# Patient Record
Sex: FEMALE | Race: BLACK OR AFRICAN AMERICAN | NOT HISPANIC OR LATINO | Employment: UNEMPLOYED | URBAN - METROPOLITAN AREA
[De-identification: names, ages, dates, MRNs, and addresses within clinical notes are randomized per-mention and may not be internally consistent; named-entity substitution may affect disease eponyms.]

---
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
## 2022-03-02 ENCOUNTER — HOSPITAL ENCOUNTER (EMERGENCY)
Age: 22
Discharge: HOME | End: 2022-03-02
Attending: STUDENT IN AN ORGANIZED HEALTH CARE EDUCATION/TRAINING PROGRAM
Payer: MEDICAID

## 2022-03-02 VITALS
BODY MASS INDEX: 19.14 KG/M2 | HEART RATE: 87 BPM | SYSTOLIC BLOOD PRESSURE: 142 MMHG | OXYGEN SATURATION: 100 % | TEMPERATURE: 98.5 F | DIASTOLIC BLOOD PRESSURE: 74 MMHG | RESPIRATION RATE: 16 BRPM | WEIGHT: 115 LBS

## 2022-03-02 DIAGNOSIS — T78.40XA ALLERGIC REACTION, INITIAL ENCOUNTER: Primary | ICD-10-CM

## 2022-03-02 PROCEDURE — 99284 EMERGENCY DEPT VISIT MOD MDM: CPT

## 2022-03-02 PROCEDURE — 6370000000 HC RX 637 (ALT 250 FOR IP): Performed by: STUDENT IN AN ORGANIZED HEALTH CARE EDUCATION/TRAINING PROGRAM

## 2022-03-02 RX ORDER — DIPHENHYDRAMINE HCL 25 MG
25 CAPSULE ORAL EVERY 6 HOURS PRN
Qty: 1 CAPSULE | Refills: 0 | Status: SHIPPED | OUTPATIENT
Start: 2022-03-02 | End: 2022-03-09

## 2022-03-02 RX ORDER — DIPHENHYDRAMINE HCL 25 MG
25 TABLET ORAL ONCE
Status: COMPLETED | OUTPATIENT
Start: 2022-03-02 | End: 2022-03-02

## 2022-03-02 RX ORDER — FAMOTIDINE 20 MG/1
20 TABLET, FILM COATED ORAL 2 TIMES DAILY
Qty: 14 TABLET | Refills: 0 | Status: SHIPPED | OUTPATIENT
Start: 2022-03-02 | End: 2022-03-09

## 2022-03-02 RX ORDER — PREDNISONE 50 MG/1
50 TABLET ORAL DAILY
Qty: 5 TABLET | Refills: 0 | Status: SHIPPED | OUTPATIENT
Start: 2022-03-02 | End: 2022-03-07

## 2022-03-02 RX ADMIN — DIPHENHYDRAMINE HYDROCHLORIDE 25 MG: 25 TABLET, FILM COATED ORAL at 21:59

## 2022-03-02 ASSESSMENT — ENCOUNTER SYMPTOMS
ABDOMINAL PAIN: 0
SHORTNESS OF BREATH: 1
VOMITING: 0
CHEST TIGHTNESS: 0
COUGH: 0
NAUSEA: 0

## 2022-03-03 NOTE — ED TRIAGE NOTES
Pt arrived by EMS after having allergic reaction at work. State previous allergic reaction to tomatoes was only itching but today was worse with diff breathing. Pt given solumedrol 125 mg IV, Pepcid 20 g IV, Benadryl 25 mg Iv and duoneb x1.
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
## 2022-05-09 ENCOUNTER — HOSPITAL ENCOUNTER (EMERGENCY)
Age: 22
Discharge: LEFT BEFORE DISCHARGE | End: 2022-05-09

## 2022-05-28 ENCOUNTER — HOSPITAL ENCOUNTER (EMERGENCY)
Age: 22
Discharge: HOME | End: 2022-05-28
Payer: MEDICAID

## 2022-05-28 VITALS
WEIGHT: 110 LBS | BODY MASS INDEX: 18.33 KG/M2 | SYSTOLIC BLOOD PRESSURE: 110 MMHG | HEIGHT: 65 IN | OXYGEN SATURATION: 100 % | DIASTOLIC BLOOD PRESSURE: 70 MMHG | RESPIRATION RATE: 18 BRPM | HEART RATE: 81 BPM | TEMPERATURE: 98.2 F

## 2022-05-28 DIAGNOSIS — Z20.822 COVID-19 RULED OUT: Primary | ICD-10-CM

## 2022-05-28 LAB
INFLUENZA A: NOT DETECTED
INFLUENZA B: NOT DETECTED
SARS-COV-2 RNA, RT PCR: NOT DETECTED

## 2022-05-28 PROCEDURE — 99283 EMERGENCY DEPT VISIT LOW MDM: CPT

## 2022-05-28 PROCEDURE — 87636 SARSCOV2 & INF A&B AMP PRB: CPT

## 2022-05-28 ASSESSMENT — ENCOUNTER SYMPTOMS
BLOOD IN STOOL: 0
SHORTNESS OF BREATH: 0
SORE THROAT: 0
RHINORRHEA: 0
ABDOMINAL PAIN: 0
VOMITING: 0
BACK PAIN: 0
EYE PAIN: 0
COUGH: 0
NAUSEA: 1
DIARRHEA: 0

## 2022-05-28 ASSESSMENT — PAIN - FUNCTIONAL ASSESSMENT: PAIN_FUNCTIONAL_ASSESSMENT: NONE - DENIES PAIN

## 2022-05-28 NOTE — LETTER
[facility]  [address] [facility] [address]  Phone: [phone]               May 28, 2022    Patient: Mo Stock   YOB: 2000   Date of Visit: 5/28/2022       To Whom It May Concern:    Mo Stock was seen and treated in our emergency department on 5/28/2022. She may return to work on 05/29/2022.       Sincerely,       Roberto Flanagan RN         Signature:__________________________________

## 2022-05-28 NOTE — Clinical Note
Devonte Morrison was seen and treated in our emergency department on 5/28/2022. She may return to work on 05/29/2022. If you have any questions or concerns, please don't hesitate to call.       Gabe Leahy, YASEMIN - CNP
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
## 2022-07-05 ENCOUNTER — HOSPITAL ENCOUNTER (EMERGENCY)
Age: 22
Discharge: HOME | End: 2022-07-05

## 2022-09-11 ENCOUNTER — HOSPITAL ENCOUNTER (EMERGENCY)
Age: 22
Discharge: HOME | End: 2022-09-11
Payer: MEDICAID

## 2022-09-11 VITALS
SYSTOLIC BLOOD PRESSURE: 113 MMHG | OXYGEN SATURATION: 99 % | TEMPERATURE: 98.3 F | HEART RATE: 84 BPM | RESPIRATION RATE: 18 BRPM | DIASTOLIC BLOOD PRESSURE: 68 MMHG

## 2022-09-11 DIAGNOSIS — H66.002 NON-RECURRENT ACUTE SUPPURATIVE OTITIS MEDIA OF LEFT EAR WITHOUT SPONTANEOUS RUPTURE OF TYMPANIC MEMBRANE: Primary | ICD-10-CM

## 2022-09-11 DIAGNOSIS — J02.9 ACUTE PHARYNGITIS, UNSPECIFIED ETIOLOGY: ICD-10-CM

## 2022-09-11 PROCEDURE — 99283 EMERGENCY DEPT VISIT LOW MDM: CPT

## 2022-09-11 RX ORDER — AMOXICILLIN 500 MG/1
500 CAPSULE ORAL 2 TIMES DAILY
Qty: 14 CAPSULE | Refills: 0 | Status: SHIPPED | OUTPATIENT
Start: 2022-09-11 | End: 2022-09-18

## 2022-09-11 RX ORDER — NAPROXEN 500 MG/1
500 TABLET ORAL 2 TIMES DAILY PRN
Qty: 20 TABLET | Refills: 0 | Status: SHIPPED | OUTPATIENT
Start: 2022-09-11

## 2022-09-11 ASSESSMENT — PAIN - FUNCTIONAL ASSESSMENT: PAIN_FUNCTIONAL_ASSESSMENT: 0-10

## 2022-09-11 ASSESSMENT — ENCOUNTER SYMPTOMS
SORE THROAT: 1
GASTROINTESTINAL NEGATIVE: 1
TROUBLE SWALLOWING: 0
RESPIRATORY NEGATIVE: 1
EYES NEGATIVE: 1

## 2022-09-11 ASSESSMENT — PAIN SCALES - GENERAL: PAINLEVEL_OUTOF10: 4

## 2022-09-12 NOTE — ED NOTES
Pt scripts x2 instructed to follow up with PCP. Assessed per Moncho JOHNSON.      Brandon Salinas, ADDISON  56/38/35 [id number]

## 2022-12-02 ENCOUNTER — HOSPITAL ENCOUNTER (EMERGENCY)
Age: 22
Discharge: HOME | End: 2022-12-02
Payer: MEDICAID

## 2022-12-02 VITALS
HEART RATE: 99 BPM | OXYGEN SATURATION: 99 % | DIASTOLIC BLOOD PRESSURE: 69 MMHG | RESPIRATION RATE: 18 BRPM | SYSTOLIC BLOOD PRESSURE: 104 MMHG | TEMPERATURE: 98.6 F

## 2022-12-02 DIAGNOSIS — J02.9 ACUTE PHARYNGITIS, UNSPECIFIED ETIOLOGY: ICD-10-CM

## 2022-12-02 DIAGNOSIS — Z20.822 LAB TEST NEGATIVE FOR COVID-19 VIRUS: ICD-10-CM

## 2022-12-02 DIAGNOSIS — B34.9 VIRAL ILLNESS: Primary | ICD-10-CM

## 2022-12-02 DIAGNOSIS — H61.23 BILATERAL IMPACTED CERUMEN: ICD-10-CM

## 2022-12-02 LAB
INFLUENZA A: NOT DETECTED
INFLUENZA B: NOT DETECTED
S PYO AG THROAT QL: NEGATIVE
SARS-COV-2 RNA, RT PCR: NOT DETECTED

## 2022-12-02 PROCEDURE — 99283 EMERGENCY DEPT VISIT LOW MDM: CPT

## 2022-12-02 PROCEDURE — 6370000000 HC RX 637 (ALT 250 FOR IP): Performed by: NURSE PRACTITIONER

## 2022-12-02 PROCEDURE — 87880 STREP A ASSAY W/OPTIC: CPT

## 2022-12-02 PROCEDURE — 87636 SARSCOV2 & INF A&B AMP PRB: CPT

## 2022-12-02 PROCEDURE — 87081 CULTURE SCREEN ONLY: CPT

## 2022-12-02 RX ORDER — ACETAMINOPHEN 500 MG
500 TABLET ORAL 4 TIMES DAILY PRN
Qty: 28 TABLET | Refills: 0 | Status: SHIPPED | OUTPATIENT
Start: 2022-12-02 | End: 2022-12-09

## 2022-12-02 RX ORDER — IBUPROFEN 600 MG/1
600 TABLET ORAL ONCE
Status: COMPLETED | OUTPATIENT
Start: 2022-12-02 | End: 2022-12-02

## 2022-12-02 RX ORDER — LIDOCAINE HYDROCHLORIDE 20 MG/ML
15 SOLUTION OROPHARYNGEAL ONCE
Status: COMPLETED | OUTPATIENT
Start: 2022-12-02 | End: 2022-12-02

## 2022-12-02 RX ORDER — ACETAMINOPHEN 325 MG/1
650 TABLET ORAL ONCE
Status: COMPLETED | OUTPATIENT
Start: 2022-12-02 | End: 2022-12-02

## 2022-12-02 RX ORDER — IBUPROFEN 600 MG/1
600 TABLET ORAL 3 TIMES DAILY PRN
Qty: 15 TABLET | Refills: 0 | Status: SHIPPED | OUTPATIENT
Start: 2022-12-02 | End: 2022-12-07

## 2022-12-02 RX ORDER — BENZOCAINE/MENTH/CETYLPYRD CL 15 MG-2 MG
1 LOZENGE MUCOUS MEMBRANE
Qty: 30 LOZENGE | Refills: 0 | Status: SHIPPED | OUTPATIENT
Start: 2022-12-02 | End: 2022-12-07

## 2022-12-02 RX ADMIN — CARBAMIDE PEROXIDE 6.5% 5 DROP: 6.5 LIQUID AURICULAR (OTIC) at 17:09

## 2022-12-02 RX ADMIN — IBUPROFEN 600 MG: 600 TABLET, FILM COATED ORAL at 16:55

## 2022-12-02 RX ADMIN — LIDOCAINE HYDROCHLORIDE 15 ML: 20 SOLUTION ORAL at 16:57

## 2022-12-02 RX ADMIN — ACETAMINOPHEN 650 MG: 325 TABLET ORAL at 16:55

## 2022-12-02 ASSESSMENT — PAIN - FUNCTIONAL ASSESSMENT: PAIN_FUNCTIONAL_ASSESSMENT: 0-10

## 2022-12-02 ASSESSMENT — PAIN DESCRIPTION - LOCATION
LOCATION: EAR
LOCATION: EAR;THROAT

## 2022-12-02 ASSESSMENT — PAIN SCALES - GENERAL
PAINLEVEL_OUTOF10: 5
PAINLEVEL_OUTOF10: 4

## 2022-12-02 ASSESSMENT — PAIN DESCRIPTION - ORIENTATION: ORIENTATION: RIGHT;LEFT

## 2022-12-02 NOTE — DISCHARGE INSTRUCTIONS
Return to emergency room for new worsening symptoms including but not limited to, developing throat closing sensation, shortness of breath, inability to tolerate food or drink, difficulty opening closing her jaw, or other symptoms/concerns. Follow-up with the emergency care clinic by calling to schedule an appointment to establish care. Medication as prescribed.

## 2022-12-02 NOTE — LETTER
[facility]  ED  [address] [facility] [address]  Phone: [phone]  Fax: [phone]               December 2, 2022    Patient: Saba Alegria   YOB: 2000   Date of Visit: 12/2/2022       To Whom It May Concern:    Saba Alegria was seen and treated in our emergency department on 12/2/2022. She is able to return to work 12/3/2022.       Sincerely,       Mary Gutierrez RN         Signature:__________________________________

## 2022-12-04 LAB — S PYO THROAT QL CULT: NORMAL

## 2022-12-06 NOTE — ED PROVIDER NOTES
[address] [place] [address]  ED  [address] [place] [id number]  Dept: [phone]  Dept Fax: [phone]  Loc: [facility]        This patient was not seen or evaluated by the attending physician. I evaluated this patient, the attending physician was available for consultation. CHIEF COMPLAINT    Chief Complaint   Patient presents with    Otalgia     Bilateral ears x3 days    Pharyngitis       HPI    Mayra Perez is a 23 y.o. female who presents with a sore throat. The onset was 2-3 days ago. The duration has been constant since the onset. The pain worsens with swallowing. The patient has associated dry cough, bilateral ear pain described as \"jabbing\" with severity 5/10. The sore throat discomfort is described as \"burning\" with a severity of 10/10. Denies nausea, vomiting, dizziness, lightheadedness, presyncope, shortness of breath, fevers, chills, sweats, change in ability to smell/taste, hemoptysis, leg/calf pain or swelling, headache-resolved, body aches, abdominal pain, diarrhea, urinary symptoms/retention, rash, neck pain/stiffness, nasal chest congestion, other concerns. Patient is not COVID-19 or influenza vaccinated. She denies any sick contacts.   She does endorse a history of strep in the past.    REVIEW OF SYSTEMS    Pulmonary: No difficulty breathing  General: No fevers, no myalgia  GI: No abdominal pain or vomiting  ENT: see HPI      PAST MEDICAL AND SURGICAL HISTORY    Past Medical History:   Diagnosis Date    Asthma      Past Surgical History:   Procedure Laterality Date    TONSILLECTOMY         CURRENT MEDICATIONS  (may include discharge medications prescribed in the ED)  Current Outpatient Rx   Medication Sig Dispense Refill    ibuprofen (ADVIL;MOTRIN) 600 MG tablet Take 1 tablet by mouth 3 times daily as needed for Pain With meals 15 tablet 0    acetaminophen (TYLENOL) 500 MG tablet Take 1 tablet by mouth 4 times daily as needed for Pain 28 tablet 0    Benzocaine-Menthol (CEPACOL SORE THROAT) 10-2.1 MG LOZG Take 1 lozenge by mouth every 2 hours as needed (sorethroat) 30 lozenge 0    naproxen (NAPROSYN) 500 MG tablet Take 1 tablet by mouth 2 times daily as needed for Pain 20 tablet 0    famotidine (PEPCID) 20 MG tablet Take 1 tablet by mouth 2 times daily for 7 days 14 tablet 0    Prenatal Vit-Fe Fumarate-FA (PRENATAL 1+1 PO) Take by mouth (Patient not taking: Reported on 5/28/2022)      Multiple Vitamins-Minerals (THERAPEUTIC MULTIVITAMIN-MINERALS W/ IRON) TABS tablet Take 1 tablet by mouth daily 90 tablet 0       ALLERGIES    Allergies   Allergen Reactions    Tomato Itching       FAMILY AND SOCIAL HISTORY    History reviewed. No pertinent family history. Social History     Socioeconomic History    Marital status: Single     Spouse name: None    Number of children: None    Years of education: None    Highest education level: None   Tobacco Use    Smoking status: Never    Smokeless tobacco: Never   Vaping Use    Vaping Use: Never used   Substance and Sexual Activity    Alcohol use: Not Currently    Drug use: Not Currently       PHYSICAL EXAM    VITAL SIGNS: /69   Pulse 99   Temp 98.6 °F (37 °C) (Oral)   Resp 18   SpO2 99%   Constitutional:  Well developed, well nourished, no acute distress  Eyes: Sclera nonicteric, conjunctiva normal   Throat/Face: + Erythematous posterior pharynx, no tonsillar hypertrophy, no exudates, no midline shift of the uvula, no macroglossia, no trismus, no facial swelling  Neck: + Bilateral anterior cervical chain/submandibular tender lymphadenopathy, supple neck without meningismus  Ears: + Partial cerumen impactions bilaterally, no auricular redness or induration. No tragal tenderness.   Respiratory:  Lungs clear to auscultation, no retractions  Cardiovascular:  regular rate, normal rhythm, no murmurs  Musculoskeletal:  No edema   GI:  Soft, no abdominal tenderness, no guarding, bowel sounds present  Neurologic: Awake, alert and oriented, no slurred speech, no tremors or ataxia, no athetotic movements  Integument:  Skin is warm and dry, no rash    RADIOLOGY/PROCEDURES    No orders to display       ED COURSE & MEDICAL DECISION MAKING    See chart for details of medications prescribed    Vitals:    12/02/22 1613   BP: 104/69   Pulse: 99   Resp: 18   Temp: 98.6 °F (37 °C)   TempSrc: Oral   SpO2: 99%         Differential diagnosis: Group A strep, Airway Obstruction, Epiglottitis, Retropharyngeal Abscess, Parapharyngeal Abscess, Pneumonia, Hypoxemia, Dehydration, Reflux Pharyngitis, other    Patient is afebrile with no evidence of stridor, drooling, posturing or respiratory distress. Rapid strep negative. COVID-19 and influenza combo swab is negative for detection of SARS-CoV-2, influenza A or influenza B. Patient has impacted cerumen bilaterally. Ears were cleansed with Debrox and disimpacted. On reevaluation cerumen impaction is cleared bilaterally and the patient endorses good improvement of the ear discomfort. The patient was instructed to follow up as an outpatient in 1-2 days with emergency care clinic. The patient was instructed to return to the ED immediately for any new or worsening symptoms. I will prescribe medication for symptomatic relief. The patient verbalized understanding. FINAL IMPRESSION    1. Viral illness    2. Bilateral impacted cerumen    3. Lab test negative for COVID-19 virus    4.  Acute pharyngitis, unspecified etiology        PLAN  Discharge instructions and outpatient followup (see EMR)      (Please note that this note was completed with a voice recognition program.  Every attempt was made to edit the dictations, but inevitably there remain words that are mis-transcribed.)      YASEMIN Mathur - CNP  12/06/22 0845

## 2023-02-15 ENCOUNTER — HOSPITAL ENCOUNTER (EMERGENCY)
Age: 23
Discharge: HOME | End: 2023-02-15
Payer: MEDICAID

## 2023-02-15 ENCOUNTER — APPOINTMENT (OUTPATIENT)
Dept: ULTRASOUND IMAGING | Age: 23
End: 2023-02-15
Payer: MEDICAID

## 2023-02-15 VITALS
BODY MASS INDEX: 18.48 KG/M2 | HEIGHT: 66 IN | HEART RATE: 85 BPM | WEIGHT: 115 LBS | DIASTOLIC BLOOD PRESSURE: 72 MMHG | TEMPERATURE: 98.6 F | SYSTOLIC BLOOD PRESSURE: 108 MMHG | RESPIRATION RATE: 18 BRPM | OXYGEN SATURATION: 100 %

## 2023-02-15 DIAGNOSIS — O46.8X1 SUBCHORIONIC HEMATOMA IN FIRST TRIMESTER, FETUS 2 OF MULTIPLE GESTATION: ICD-10-CM

## 2023-02-15 DIAGNOSIS — O41.8X12 SUBCHORIONIC HEMATOMA IN FIRST TRIMESTER, FETUS 2 OF MULTIPLE GESTATION: ICD-10-CM

## 2023-02-15 DIAGNOSIS — R10.9 ABDOMINAL PAIN IN PREGNANCY, FIRST TRIMESTER: Primary | ICD-10-CM

## 2023-02-15 DIAGNOSIS — O26.891 ABDOMINAL PAIN IN PREGNANCY, FIRST TRIMESTER: Primary | ICD-10-CM

## 2023-02-15 LAB
A/G RATIO: 2 (ref 1.1–2.2)
ALBUMIN SERPL-MCNC: 4.5 G/DL (ref 3.4–5)
ALP BLD-CCNC: 56 U/L (ref 40–129)
ALT SERPL-CCNC: 8 U/L (ref 10–40)
ANION GAP SERPL CALCULATED.3IONS-SCNC: 10 MMOL/L (ref 3–16)
AST SERPL-CCNC: 12 U/L (ref 15–37)
BASOPHILS ABSOLUTE: 0 K/UL (ref 0–0.2)
BASOPHILS RELATIVE PERCENT: 0.3 %
BILIRUB SERPL-MCNC: 0.4 MG/DL (ref 0–1)
BILIRUBIN URINE: NEGATIVE
BLOOD, URINE: NEGATIVE
BUN BLDV-MCNC: 7 MG/DL (ref 7–20)
CALCIUM SERPL-MCNC: 8.6 MG/DL (ref 8.3–10.6)
CHLORIDE BLD-SCNC: 100 MMOL/L (ref 99–110)
CLARITY: CLEAR
CO2: 22 MMOL/L (ref 21–32)
COLOR: YELLOW
CREAT SERPL-MCNC: <0.5 MG/DL (ref 0.6–1.1)
EOSINOPHILS ABSOLUTE: 0 K/UL (ref 0–0.6)
EOSINOPHILS RELATIVE PERCENT: 0.6 %
GFR SERPL CREATININE-BSD FRML MDRD: >60 ML/MIN/{1.73_M2}
GLUCOSE BLD-MCNC: 67 MG/DL (ref 70–99)
GLUCOSE URINE: NEGATIVE MG/DL
GONADOTROPIN, CHORIONIC (HCG) QUANT: NORMAL MIU/ML
HCG(URINE) PREGNANCY TEST: POSITIVE
HCT VFR BLD CALC: 30.1 % (ref 36–48)
HEMOGLOBIN: 10.4 G/DL (ref 12–16)
KETONES, URINE: 15 MG/DL
LEUKOCYTE ESTERASE, URINE: NEGATIVE
LIPASE: 30 U/L (ref 13–60)
LYMPHOCYTES ABSOLUTE: 1.7 K/UL (ref 1–5.1)
LYMPHOCYTES RELATIVE PERCENT: 22.5 %
MCH RBC QN AUTO: 29.4 PG (ref 26–34)
MCHC RBC AUTO-ENTMCNC: 34.7 G/DL (ref 31–36)
MCV RBC AUTO: 84.9 FL (ref 80–100)
MICROSCOPIC EXAMINATION: ABNORMAL
MONOCYTES ABSOLUTE: 0.5 K/UL (ref 0–1.3)
MONOCYTES RELATIVE PERCENT: 6.5 %
NEUTROPHILS ABSOLUTE: 5.4 K/UL (ref 1.7–7.7)
NEUTROPHILS RELATIVE PERCENT: 70.1 %
NITRITE, URINE: NEGATIVE
PDW BLD-RTO: 13.8 % (ref 12.4–15.4)
PH UA: 6.5 (ref 5–8)
PLATELET # BLD: 206 K/UL (ref 135–450)
PMV BLD AUTO: 8.8 FL (ref 5–10.5)
POTASSIUM REFLEX MAGNESIUM: 3.8 MMOL/L (ref 3.5–5.1)
PROTEIN UA: NEGATIVE MG/DL
RBC # BLD: 3.55 M/UL (ref 4–5.2)
SODIUM BLD-SCNC: 132 MMOL/L (ref 136–145)
SPECIFIC GRAVITY UA: 1.01 (ref 1–1.03)
TOTAL PROTEIN: 6.8 G/DL (ref 6.4–8.2)
URINE REFLEX TO CULTURE: ABNORMAL
URINE TYPE: ABNORMAL
UROBILINOGEN, URINE: 1 E.U./DL
WBC # BLD: 7.7 K/UL (ref 4–11)

## 2023-02-15 PROCEDURE — 83690 ASSAY OF LIPASE: CPT

## 2023-02-15 PROCEDURE — 76801 OB US < 14 WKS SINGLE FETUS: CPT

## 2023-02-15 PROCEDURE — 81003 URINALYSIS AUTO W/O SCOPE: CPT

## 2023-02-15 PROCEDURE — 6370000000 HC RX 637 (ALT 250 FOR IP): Performed by: PHYSICIAN ASSISTANT

## 2023-02-15 PROCEDURE — 80053 COMPREHEN METABOLIC PANEL: CPT

## 2023-02-15 PROCEDURE — 2580000003 HC RX 258: Performed by: PHYSICIAN ASSISTANT

## 2023-02-15 PROCEDURE — 84702 CHORIONIC GONADOTROPIN TEST: CPT

## 2023-02-15 PROCEDURE — 84703 CHORIONIC GONADOTROPIN ASSAY: CPT

## 2023-02-15 PROCEDURE — 99284 EMERGENCY DEPT VISIT MOD MDM: CPT

## 2023-02-15 PROCEDURE — 36415 COLL VENOUS BLD VENIPUNCTURE: CPT

## 2023-02-15 PROCEDURE — 85025 COMPLETE CBC W/AUTO DIFF WBC: CPT

## 2023-02-15 RX ORDER — 0.9 % SODIUM CHLORIDE 0.9 %
1000 INTRAVENOUS SOLUTION INTRAVENOUS ONCE
Status: COMPLETED | OUTPATIENT
Start: 2023-02-15 | End: 2023-02-15

## 2023-02-15 RX ORDER — ACETAMINOPHEN 325 MG/1
650 TABLET ORAL ONCE
Status: COMPLETED | OUTPATIENT
Start: 2023-02-15 | End: 2023-02-15

## 2023-02-15 RX ADMIN — ACETAMINOPHEN 650 MG: 325 TABLET ORAL at 17:57

## 2023-02-15 RX ADMIN — SODIUM CHLORIDE 1000 ML: 9 INJECTION, SOLUTION INTRAVENOUS at 17:56

## 2023-02-15 ASSESSMENT — PAIN - FUNCTIONAL ASSESSMENT: PAIN_FUNCTIONAL_ASSESSMENT: 0-10

## 2023-02-15 ASSESSMENT — ENCOUNTER SYMPTOMS
SHORTNESS OF BREATH: 0
VOMITING: 0
ABDOMINAL PAIN: 1

## 2023-02-15 ASSESSMENT — PAIN SCALES - GENERAL: PAINLEVEL_OUTOF10: 5

## 2023-02-15 NOTE — ED PROVIDER NOTES
[address] ED  EMERGENCY DEPARTMENT ENCOUNTER        Pt Name: Veronica Orantes  MRN: [id number]  [place] 2000  Date of evaluation: 2/15/2023  Provider: Willie Albert PA-C  PCP: No primary care provider on file. Note Started: 4:01 PM EST 2/15/23      RAÚL. I have evaluated this patient. My supervising physician was available for consultation. CHIEF COMPLAINT       Chief Complaint   Patient presents with    Abdominal Pain     Patient comes in due to lower abdominal pain across whole abdomen. Patient is 8 weeks pregnant. Denies any bleeding. HISTORY OF PRESENT ILLNESS: 1 or more Elements     History From: Patient  Limitations to history : None    Veronica Orantes is a 23 y.o. female who presents to the emergency department for evaluation of lower abdominal pain symptoms for the past 2 to 3 days. She is 8 weeks pregnant. This is her third pregnancy. States no problems with the first 2. Denies any vaginal bleeding. Having crampy type intermittent pain in her lower abdomen and back. No fever no vomiting. No dysuria or hematuria. Plans to see  OB/GYN has not seen them yet for this pregnancy has not had an ultrasound yet with this pregnancy. Blood type 0+. Nursing Notes were all reviewed and agreed with or any disagreements were addressed in the HPI. REVIEW OF SYSTEMS :      Review of Systems   Constitutional:  Negative for fever. Respiratory:  Negative for shortness of breath. Cardiovascular:  Negative for chest pain. Gastrointestinal:  Positive for abdominal pain. Negative for vomiting. Genitourinary:  Negative for difficulty urinating, dysuria, hematuria, vaginal bleeding and vaginal discharge. Positives and Pertinent negatives as per HPI.      SURGICAL HISTORY     Past Surgical History:   Procedure Laterality Date    TONSILLECTOMY         CURRENTMEDICATIONS       Discharge Medication List as of 2/15/2023  8:54 PM        CONTINUE these medications which have NOT CHANGED    Details   ibuprofen (ADVIL;MOTRIN) 600 MG tablet Take 1 tablet by mouth 3 times daily as needed for Pain With meals, Disp-15 tablet, R-0Print      acetaminophen (TYLENOL) 500 MG tablet Take 1 tablet by mouth 4 times daily as needed for Pain, Disp-28 tablet, R-0Print      naproxen (NAPROSYN) 500 MG tablet Take 1 tablet by mouth 2 times daily as needed for Pain, Disp-20 tablet, R-0Normal      famotidine (PEPCID) 20 MG tablet Take 1 tablet by mouth 2 times daily for 7 days, Disp-14 tablet, R-0Normal      Prenatal Vit-Fe Fumarate-FA (PRENATAL 1+1 PO) Take by mouthHistorical Med      Multiple Vitamins-Minerals (THERAPEUTIC MULTIVITAMIN-MINERALS W/ IRON) TABS tablet Take 1 tablet by mouth daily, Disp-90 tablet, R-0Print             ALLERGIES     Tomato    FAMILYHISTORY     No family history on file. SOCIAL HISTORY       Social History     Tobacco Use    Smoking status: Never    Smokeless tobacco: Never   Vaping Use    Vaping Use: Never used   Substance Use Topics    Alcohol use: Not Currently    Drug use: Not Currently       SCREENINGS                         CIWA Assessment  BP: 108/72  Heart Rate: 85           PHYSICAL EXAM  1 or more Elements     ED Triage Vitals [02/15/23 1550]   BP Temp Temp Source Heart Rate Resp SpO2 Height Weight   90/60 98.6 °F (37 °C) Oral 82 19 98 % 5' 6\" (1.676 m) 115 lb (52.2 kg)       Physical Exam  Vitals and nursing note reviewed. Constitutional:       Appearance: She is well-developed. She is not ill-appearing or toxic-appearing. HENT:      Head: Normocephalic and atraumatic. Mouth/Throat:      Mouth: Mucous membranes are moist.      Pharynx: Oropharynx is clear. No posterior oropharyngeal erythema. Eyes:      Conjunctiva/sclera: Conjunctivae normal.   Cardiovascular:      Rate and Rhythm: Normal rate and regular rhythm. Pulmonary:      Effort: Pulmonary effort is normal. No respiratory distress. Breath sounds: Normal breath sounds.  No wheezing or rales.   Abdominal:      General: Bowel sounds are normal. There is no distension. Palpations: Abdomen is soft. There is no mass. Tenderness: There is abdominal tenderness (mild tenderness across lower abdomen). There is no guarding or rebound. Skin:     General: Skin is warm and dry. Neurological:      Mental Status: She is alert and oriented to person, place, and time. Motor: No abnormal muscle tone. Psychiatric:         Behavior: Behavior normal. Behavior is cooperative.            DIAGNOSTIC RESULTS   LABS:    Labs Reviewed   CBC WITH AUTO DIFFERENTIAL - Abnormal; Notable for the following components:       Result Value    RBC 3.55 (*)     Hemoglobin 10.4 (*)     Hematocrit 30.1 (*)     All other components within normal limits   COMPREHENSIVE METABOLIC PANEL W/ REFLEX TO MG FOR LOW K - Abnormal; Notable for the following components:    Sodium 132 (*)     Glucose 67 (*)     Creatinine <0.5 (*)     ALT 8 (*)     AST 12 (*)     All other components within normal limits   URINALYSIS WITH REFLEX TO CULTURE - Abnormal; Notable for the following components:    Ketones, Urine 15 (*)     All other components within normal limits   LIPASE   PREGNANCY, URINE   HCG, QUANTITATIVE, PREGNANCY     Results for orders placed or performed during the hospital encounter of 02/15/23   CBC with Auto Differential   Result Value Ref Range    WBC 7.7 4.0 - 11.0 K/uL    RBC 3.55 (L) 4.00 - 5.20 M/uL    Hemoglobin 10.4 (L) 12.0 - 16.0 g/dL    Hematocrit 30.1 (L) 36.0 - 48.0 %    MCV 84.9 80.0 - 100.0 fL    MCH 29.4 26.0 - 34.0 pg    MCHC 34.7 31.0 - 36.0 g/dL    RDW 13.8 12.4 - 15.4 %    Platelets 082 353 - 773 K/uL    MPV 8.8 5.0 - 10.5 fL    Neutrophils % 70.1 %    Lymphocytes % 22.5 %    Monocytes % 6.5 %    Eosinophils % 0.6 %    Basophils % 0.3 %    Neutrophils Absolute 5.4 1.7 - 7.7 K/uL    Lymphocytes Absolute 1.7 1.0 - 5.1 K/uL    Monocytes Absolute 0.5 0.0 - 1.3 K/uL    Eosinophils Absolute 0.0 0.0 - 0.6 K/uL Basophils Absolute 0.0 0.0 - 0.2 K/uL   Comprehensive Metabolic Panel w/ Reflex to MG   Result Value Ref Range    Sodium 132 (L) 136 - 145 mmol/L    Potassium reflex Magnesium 3.8 3.5 - 5.1 mmol/L    Chloride 100 99 - 110 mmol/L    CO2 22 21 - 32 mmol/L    Anion Gap 10 3 - 16    Glucose 67 (L) 70 - 99 mg/dL    BUN 7 7 - 20 mg/dL    Creatinine <0.5 (L) 0.6 - 1.1 mg/dL    Est, Glom Filt Rate >60 >60    Calcium 8.6 8.3 - 10.6 mg/dL    Total Protein 6.8 6.4 - 8.2 g/dL    Albumin 4.5 3.4 - 5.0 g/dL    Albumin/Globulin Ratio 2.0 1.1 - 2.2    Total Bilirubin 0.4 0.0 - 1.0 mg/dL    Alkaline Phosphatase 56 40 - 129 U/L    ALT 8 (L) 10 - 40 U/L    AST 12 (L) 15 - 37 U/L   Lipase   Result Value Ref Range    Lipase 30.0 13.0 - 60.0 U/L   Urinalysis with Reflex to Culture    Specimen: Urine, clean catch   Result Value Ref Range    Color, UA Yellow Straw/Yellow    Clarity, UA Clear Clear    Glucose, Ur Negative Negative mg/dL    Bilirubin Urine Negative Negative    Ketones, Urine 15 (A) Negative mg/dL    Specific Gravity, UA 1.015 1.005 - 1.030    Blood, Urine Negative Negative    pH, UA 6.5 5.0 - 8.0    Protein, UA Negative Negative mg/dL    Urobilinogen, Urine 1.0 <2.0 E.U./dL    Nitrite, Urine Negative Negative    Leukocyte Esterase, Urine Negative Negative    Microscopic Examination Not Indicated     Urine Type NotGiven     Urine Reflex to Culture Not Indicated    Pregnancy, Urine   Result Value Ref Range    HCG(Urine) Pregnancy Test POSITIVE Detects HCG level >20 MIU/mL   hCG, quantitative, pregnancy   Result Value Ref Range    hCG Quant 974037.0 <5.0 mIU/mL       When ordered only abnormal lab results are displayed. All other labs were within normal range or not returned as of this dictation. EKG: When ordered, EKG's are interpreted by the Emergency Department Physician in the absence of a cardiologist.  Please see their note for interpretation of EKG.     RADIOLOGY:   Non-plain film images such as CT, Ultrasound and MRI are read by the radiologist. Plain radiographic images are visualized and preliminarily interpreted by the ED Provider with the below findings:      Interpretation per the Radiologist below, if available at the time of this note:    US OB LESS THAN 14 [address] [place]   Final Result   Live twin dichorionic diamniotic pregnancy of gestational age 7 weeks 5 days. Small subchorionic hemorrhage. US OB LESS THAN 14 WEEKS SINGLE OR FIRST GESTATION    Result Date: 2/15/2023  EXAMINATION: FIRST TRIMESTER OBSTETRIC ULTRASOUND 2/15/2023 TECHNIQUE: Transvaginal first trimester obstetric pelvic ultrasound was performed. COMPARISON: None HISTORY: ORDERING SYSTEM PROVIDED HISTORY: 8 weeks pregnant, abdominal pain, r/o ectopic TECHNOLOGIST PROVIDED HISTORY: Reason for exam:->8 weeks pregnant, abdominal pain, r/o ectopic FINDINGS: Uterus: 7.8 x 6.5 x 4.7 cm Gestational Sac(s):  Twin dichorionic diamniotic pregnancy. Small subchorionic hemorrhage. Yolk Sacs:  Present Fetal Pole: Two fetal poles Fetus A: Crown Rump Length:  8 mm Fetal Heart Rate:  151 Fetus B: Crown Rump Length:  9 mm Fetal Heart Rate:  159 Right ovary: 10.6 x 1.6 x 1.7 cm Left ovary: 3.3 x 1.3 x 1.6 cm Flow is preserved to the ovaries with appropriate waveforms. Free fluid: None Measurements: Estimated gestational age by current ultrasound: 6 weeks 5 days Estimated gestational age by LMP/prior ultrasound: Unknown Estimated Due Date: 10/06/2023     Live twin dichorionic diamniotic pregnancy of gestational age 7 weeks 5 days. Small subchorionic hemorrhage. No results found. PROCEDURES   Unless otherwise noted below, none     Procedures    CRITICAL CARE TIME (.cctime)   0    PAST MEDICAL HISTORY      has a past medical history of Asthma.      EMERGENCY DEPARTMENT COURSE and DIFFERENTIAL DIAGNOSIS/MDM:   Vitals:    Vitals:    02/15/23 1550 02/15/23 1900   BP: 90/60 108/72   Pulse: 82 85   Resp: 19 18   Temp: 98.6 °F (37 °C) TempSrc: Oral    SpO2: 98% 100%   Weight: 115 lb (52.2 kg)    Height: 5' 6\" (1.676 m)        Patient was given the following medications:  Medications   0.9 % sodium chloride bolus (0 mLs IntraVENous Stopped 2/15/23 1911)   acetaminophen (TYLENOL) tablet 650 mg (650 mg Oral Given 2/15/23 1757)             Is this patient to be included in the SEP-1 Core Measure due to severe sepsis or septic shock? No   Exclusion criteria - the patient is NOT to be included for SEP-1 Core Measure due to: Infection is not suspected    Chronic Conditions affecting care:    has a past medical history of Asthma. CONSULTS: (Who and What was discussed)  None      Social Determinants Significantly Affecting Health: None    Records Reviewed (External and Source)   Outpatient OB/GYN records patient has been seen at [facility] in past for high risk pregnancy    CC/HPI Summary, DDx, ED Course, and Reassessment:     Patient presented to the ER for evaluation of lower abdominal pain she is 8 weeks pregnant. She has mild tenderness across lower abdomen on exam.  Given Tylenol here. We will obtain work-up including CBC, CMP, lipase, urinalysis and first trimester pelvic ultrasound. Glucose a little low at 67 patient eating cheese crackers here. Urinalysis negative for urinary tract infection. Pelvic ultrasound resulted showing live twin dichorionic diamniotic pregnancy of gestational age 7 weeks 5 days. Small subchorionic hemorrhage. She does not have any vaginal bleeding  Blood type is O+  Plans to see  OB/GYN and has an appointment on January 27 call out to see OB/GYN for consult    I spoke with Dr Keith Brito ob/gyn notified of patient's symptoms and test results in agreement with outpatient follow-up patient is stable advised pelvic rest.  She will be discharged home with family. Advise returning for any worsening symptoms. She understands and agrees.       I estimate there is LOW risk for OVARIAN TORSION, ECTOPIC PREGNANCY, ACUTE APPENDICITIS, BOWEL OBSTRUCTION thus I consider the discharge disposition reasonable. Also, there is no evidence or peritonitis, sepsis, or toxicity. I am the Primary Clinician of Record. FINAL IMPRESSION      1. Abdominal pain in pregnancy, first trimester    2.  Subchorionic hematoma in first trimester, fetus 2 of multiple gestation          DISPOSITION/PLAN     DISPOSITION Decision to Discharge    PATIENT REFERRED TO:   OB/GYN      As previously scheduled    DISCHARGE MEDICATIONS:  Discharge Medication List as of 2/15/2023  8:54 PM          DISCONTINUED MEDICATIONS:  Discharge Medication List as of 2/15/2023  8:54 PM                 (Please note that portions of this note were completed with a voice recognition program.  Efforts were made to edit the dictations but occasionally words are mis-transcribed.)    Dona Marie PA-C (electronically signed)           Delia Valenzuela PA-C  02/15/23 1948

## 2023-02-16 NOTE — ED NOTES
1956 call placed to [facility] for consult with [facility]      Horace Minus  02/15/23 1959

## 2023-03-07 ENCOUNTER — OFFICE VISIT (OUTPATIENT)
Dept: URGENT CARE | Age: 23
End: 2023-03-07

## 2023-03-07 VITALS
OXYGEN SATURATION: 98 % | DIASTOLIC BLOOD PRESSURE: 76 MMHG | SYSTOLIC BLOOD PRESSURE: 110 MMHG | HEART RATE: 85 BPM | BODY MASS INDEX: 16.64 KG/M2 | WEIGHT: 106 LBS | HEIGHT: 67 IN | TEMPERATURE: 98.5 F

## 2023-03-07 DIAGNOSIS — R51.9 ACUTE INTRACTABLE HEADACHE, UNSPECIFIED HEADACHE TYPE: Primary | ICD-10-CM

## 2023-03-07 DIAGNOSIS — E86.0 DEHYDRATION: ICD-10-CM

## 2023-03-07 LAB
Lab: NORMAL
PERFORMING INSTRUMENT: NORMAL
QC PASS/FAIL: NORMAL
SARS-COV-2, POC: NORMAL

## 2023-03-07 RX ORDER — LANOLIN ALCOHOL/MO/W.PET/CERES
50 CREAM (GRAM) TOPICAL 3 TIMES DAILY
COMMUNITY

## 2023-03-07 RX ORDER — ONDANSETRON 8 MG/1
4 TABLET, ORALLY DISINTEGRATING ORAL EVERY 8 HOURS PRN
COMMUNITY

## 2023-03-07 ASSESSMENT — ENCOUNTER SYMPTOMS
NAUSEA: 1
ABDOMINAL PAIN: 0
VOMITING: 0
DIARRHEA: 0
COUGH: 0

## 2023-03-07 NOTE — PATIENT INSTRUCTIONS
Covid testing was negative  Increase water intake. Use flavoring for taste. Tylenol as needed for headaches  Call OB/GYN and notify physician of symptoms.

## 2023-03-07 NOTE — PROGRESS NOTES
Herman Langford (:  2000) is a 23 y.o. female,New patient, here for evaluation of the following chief complaint(s):  Headache and Nausea (10 weeks pregnant with twins)      ASSESSMENT/PLAN:  1. Acute intractable headache, unspecified headache type  Rapid Covid negative  VSS, patient normotensive  Increase water intake. Use flavoring for taste. Tylenol as needed for headaches  Call OB/GYN and notify physician of symptoms.     - POCT COVID-19, Antigen     Return if symptoms worsen or fail to improve. SUBJECTIVE/OBJECTIVE:  Patient comes in today for headache and nausea for one days. Patient is  10 weeks OB with twins. States she was hospitalized last week for hyperemesis gravidarum. Has been placed on Vitamin B6, Unisom and Zofran PRN. Denies any abdominal pain or leaking of fluids. States Tylenol has not been helping at home. Patient has been able to eat, states fluid intake has been down and she mostly drinks soda. Eating [employer] french fries and milkshake during visit. Next prenatal visit 2023      History provided by:  Patient   used: No    Headache    Vitals:    23 1324   BP: 110/76   Site: Right Upper Arm   Position: Sitting   Cuff Size: Medium Adult   Pulse: 85   Temp: 98.5 °F (36.9 °C)   TempSrc: Oral   SpO2: 98%   Weight: 106 lb (48.1 kg)   Height: 5' 6.5\" (1.689 m)       Review of Systems   Constitutional:  Negative for fever. HENT:  Negative for congestion. Respiratory:  Negative for cough. Gastrointestinal:  Positive for nausea. Negative for abdominal pain, diarrhea and vomiting. Genitourinary:  Negative for vaginal bleeding and vaginal discharge. Neurological:  Positive for headaches. Physical Exam  Constitutional:       Appearance: Normal appearance. HENT:      Head: Normocephalic and atraumatic. Cardiovascular:      Rate and Rhythm: Normal rate and regular rhythm. Pulses: Normal pulses. Heart sounds: Normal heart sounds.  No murmur heard. No friction rub. No gallop. Pulmonary:      Effort: Pulmonary effort is normal.      Breath sounds: Normal breath sounds. Abdominal:      General: Abdomen is flat. Bowel sounds are normal.      Palpations: Abdomen is soft. Tenderness: There is no abdominal tenderness. Skin:     General: Skin is warm and dry. Coloration: Skin is pale. Neurological:      Mental Status: She is alert and oriented to person, place, and time. An electronic signature was used to authenticate this note.     --NANO Galeano - CNP

## 2023-03-07 NOTE — LETTER
March 7, 2023       Nafisa Farrell YOB: 2000   [address] [facility] Date of Visit:  3/7/2023       To Whom It May Concern: It is my medical opinion that Nafisa Farrell may return to work on 3/8/2023. If you have any questions or concerns, please don't hesitate to call.     Sincerely,        Luis Alberto Saha, NANO - CNP

## 2023-03-09 ENCOUNTER — OFFICE VISIT (OUTPATIENT)
Dept: URGENT CARE | Age: 23
End: 2023-03-09

## 2023-03-09 VITALS
HEIGHT: 66 IN | DIASTOLIC BLOOD PRESSURE: 68 MMHG | TEMPERATURE: 98.2 F | WEIGHT: 105 LBS | SYSTOLIC BLOOD PRESSURE: 101 MMHG | BODY MASS INDEX: 16.88 KG/M2 | HEART RATE: 92 BPM | OXYGEN SATURATION: 99 %

## 2023-03-09 DIAGNOSIS — H69.83 DYSFUNCTION OF BOTH EUSTACHIAN TUBES: Primary | ICD-10-CM

## 2023-03-09 RX ORDER — FLUTICASONE PROPIONATE 50 MCG
1 SPRAY, SUSPENSION (ML) NASAL DAILY
Qty: 16 G | Refills: 0 | COMMUNITY
Start: 2023-03-09

## 2023-03-09 ASSESSMENT — ENCOUNTER SYMPTOMS
SINUS PRESSURE: 0
COUGH: 1
VOMITING: 1
SINUS PAIN: 0
SORE THROAT: 0

## 2023-03-09 NOTE — LETTER
March 9, 2023       Bryan Downs YOB: 2000   [address] [facility] Date of Visit:  3/9/2023       To Whom It May Concern: It is my medical opinion that Bryan Downs may return to work on 3/10/23. If you have any questions or concerns, please don't hesitate to call.     Sincerely,          Linda Bennett, NANO - CNP

## 2023-03-09 NOTE — PROGRESS NOTES
Luci Perdue (:  2000) is a 23 y.o. female,New patient, here for evaluation of the following chief complaint(s):  Cough (Entered by patient), Otalgia (Congestion and left ear pain approx 1 week), and Congestion      ASSESSMENT/PLAN:    ICD-10-CM    1. Dysfunction of both eustachian tubes  H69.83 fluticasone (FLONASE) 50 MCG/ACT nasal spray        Reviewed AVS with patient. All questions answered    Return if symptoms worsen or fail to improve. SUBJECTIVE/OBJECTIVE:  23year old female presents with c/o left ear pain and mild cough for 1 week. She has h/o ear infections and was last treated ~ 2 months ago with ATB. She rates ear pain 5/10 and is intermittent. She has not treated with OTC medications for her symptoms. Has known exposure to sig other with similar symptoms. History provided by:  Patient   used: No      Vitals:    23 1701   BP: 101/68   Pulse: 92   Temp: 98.2 °F (36.8 °C)   SpO2: 99%   Weight: 105 lb (47.6 kg)   Height: 5' 6\" (1.676 m)       Review of Systems   Constitutional:  Positive for appetite change. Negative for fatigue and fever. HENT:  Positive for congestion and ear pain. Negative for sinus pressure, sinus pain and sore throat. Left ear pain   Respiratory:  Positive for cough. Dry cough    Cardiovascular: Negative. Gastrointestinal:  Positive for vomiting. Has morning sickness with pregnancy   Neurological:  Positive for headaches. Physical Exam  Vitals reviewed. Constitutional:       General: She is not in acute distress. Appearance: Normal appearance. She is not ill-appearing. HENT:      Head: Normocephalic. Right Ear: Hearing, ear canal and external ear normal. A middle ear effusion is present. No PE tube. Tympanic membrane is not erythematous or bulging. Left Ear: Hearing, ear canal and external ear normal. A middle ear effusion is present. No PE tube.  Tympanic membrane is not erythematous or bulging.      Nose: No septal deviation, mucosal edema, congestion or rhinorrhea.      Right Sinus: No maxillary sinus tenderness or frontal sinus tenderness.      Left Sinus: No maxillary sinus tenderness or frontal sinus tenderness.      Mouth/Throat:      Pharynx: Oropharynx is clear. Uvula midline. No pharyngeal swelling, oropharyngeal exudate, posterior oropharyngeal erythema or uvula swelling.      Tonsils: No tonsillar exudate or tonsillar abscesses. 0 on the right. 0 on the left.   Eyes:      General:         Right eye: No discharge.         Left eye: No discharge.      Conjunctiva/sclera: Conjunctivae normal.   Cardiovascular:      Rate and Rhythm: Normal rate and regular rhythm.      Pulses: Normal pulses.      Heart sounds: Normal heart sounds. No murmur heard.  Pulmonary:      Effort: Pulmonary effort is normal. No respiratory distress.      Breath sounds: Normal breath sounds.      Comments: No cough on exam   Musculoskeletal:      Cervical back: Normal range of motion and neck supple. No rigidity or tenderness.   Skin:     General: Skin is warm and dry.   Neurological:      Mental Status: She is alert and oriented to person, place, and time.   Psychiatric:         Behavior: Behavior normal.         An electronic signature was used to authenticate this note.    --Janet Montano, APRN - CNP

## 2023-03-18 ENCOUNTER — OFFICE VISIT (OUTPATIENT)
Dept: URGENT CARE | Age: 23
End: 2023-03-18

## 2023-03-18 VITALS
HEIGHT: 67 IN | WEIGHT: 107.6 LBS | DIASTOLIC BLOOD PRESSURE: 72 MMHG | OXYGEN SATURATION: 99 % | SYSTOLIC BLOOD PRESSURE: 106 MMHG | TEMPERATURE: 98.2 F | BODY MASS INDEX: 16.89 KG/M2 | HEART RATE: 119 BPM

## 2023-03-18 DIAGNOSIS — H60.501 ACUTE OTITIS EXTERNA OF RIGHT EAR, UNSPECIFIED TYPE: Primary | ICD-10-CM

## 2023-03-18 RX ORDER — NEOMYCIN SULFATE, POLYMYXIN B SULFATE, HYDROCORTISONE 3.5; 10000; 1 MG/ML; [USP'U]/ML; MG/ML
2 SOLUTION/ DROPS AURICULAR (OTIC) EVERY 8 HOURS SCHEDULED
Qty: 1 EACH | Refills: 0 | Status: SHIPPED | OUTPATIENT
Start: 2023-03-18 | End: 2023-03-28

## 2023-03-18 NOTE — PROGRESS NOTES
Claudine Jenkins (:  2000) is a 23 y.o. female,Established patient, here for evaluation of the following chief complaint(s):  Otalgia (Causing, x6 hrs. No other sxs. )      ASSESSMENT/PLAN:    ICD-10-CM    1. Acute otitis externa of right ear, unspecified type  H60.501 neomycin-polymyxin-hydrocortisone 1 % SOLN otic solution            Right ear canal infection. Take meds as prescribed. Hydrogen peroxide for symptoms of wax build up in left ear as discussed. Follow up in 7 days if symptoms persist or if symptoms worsen. SUBJECTIVE/OBJECTIVE:    History provided by:  Patient   used: No    HPI:   23 y.o. female presents with symptoms of ear pain right ear ongoing since today. Denies dizziness, fever. Has taken nothing for symptoms. Vitals:    23 1145   BP: 106/72   Pulse: (!) 119   Temp: 98.2 °F (36.8 °C)   SpO2: 99%   Weight: 107 lb 9.6 oz (48.8 kg)   Height: 5' 6.5\" (1.689 m)       Review of Systems   HENT:  Positive for ear pain (right ear). All other systems reviewed and are negative. Physical Exam  Constitutional:       Appearance: Normal appearance. HENT:      Right Ear: Hearing and tympanic membrane normal.      Left Ear: Hearing and tympanic membrane normal.      Ears:      Comments: Right ear canal swelling with purulent discharge , left ear canal excessive cerumen  Eyes:      Conjunctiva/sclera:      Right eye: Right eye conjunctiva injected: d.   Skin:     General: Skin is warm and dry. Neurological:      Mental Status: She is alert and oriented to person, place, and time. Psychiatric:         Mood and Affect: Mood normal.         Behavior: Behavior normal.         An electronic signature was used to authenticate this note.     --Parris Goltz, APRN - CNP

## 2023-03-18 NOTE — PATIENT INSTRUCTIONS
Right ear canal infection. Take meds as prescribed. Hydrogen peroxide for symptoms of wax build up in left ear as discussed. Follow up in 7 days if symptoms persist or if symptoms worsen.   New Prescriptions    NEOMYCIN-POLYMYXIN-HYDROCORTISONE 1 % SOLN OTIC SOLUTION    Place 2 drops into the right ear every 8 hours for 10 days

## 2023-04-17 ENCOUNTER — OFFICE VISIT (OUTPATIENT)
Dept: URGENT CARE | Age: 23
End: 2023-04-17

## 2023-04-17 VITALS
OXYGEN SATURATION: 99 % | SYSTOLIC BLOOD PRESSURE: 107 MMHG | RESPIRATION RATE: 16 BRPM | WEIGHT: 114 LBS | DIASTOLIC BLOOD PRESSURE: 73 MMHG | BODY MASS INDEX: 18.32 KG/M2 | TEMPERATURE: 98.8 F | HEIGHT: 66 IN | HEART RATE: 90 BPM

## 2023-04-17 DIAGNOSIS — Z3A.15 15 WEEKS GESTATION OF PREGNANCY: ICD-10-CM

## 2023-04-17 DIAGNOSIS — H60.331 ACUTE SWIMMER'S EAR OF RIGHT SIDE: Primary | ICD-10-CM

## 2023-04-17 RX ORDER — CIPROFLOXACIN AND DEXAMETHASONE 3; 1 MG/ML; MG/ML
4 SUSPENSION/ DROPS AURICULAR (OTIC) 2 TIMES DAILY
Qty: 1 EACH | Refills: 0 | Status: SHIPPED | OUTPATIENT
Start: 2023-04-17 | End: 2023-04-24

## 2023-04-17 ASSESSMENT — ENCOUNTER SYMPTOMS
GASTROINTESTINAL NEGATIVE: 1
RESPIRATORY NEGATIVE: 1
EYES NEGATIVE: 1

## 2023-04-17 NOTE — PROGRESS NOTES
Brad King (:  2000) is a 23 y.o. female,Established patient, here for evaluation of the following chief complaint(s):  Otalgia (Rt ear pain for 1 week. Effecting her sleep.)      ASSESSMENT/PLAN:    ICD-10-CM    1. Acute swimmer's ear of right side  H60.331       2. 15 weeks gestation of pregnancy  Z3A.15       Reviewed AVS with patient. All questions answered  Tylenol as needed for pain    Return if symptoms worsen or fail to improve. SUBJECTIVE/OBJECTIVE:  23year old female presents with c/o right ear pain for 1 week. States that pain is keeping her up at night. Denies any associated symptoms. She has been treating with Tylenol and application of ice. Last took tylenol 12 hours ago with mild effectiveness. She has h/o frequent ear infections, last treated 2 weeks ago. She rates pain 10/10 and is constant. History provided by:  Patient   used: No      Vitals:    23 1003   BP: 107/73   Site: Left Upper Arm   Position: Sitting   Cuff Size: Medium Adult   Pulse: 90   Resp: 16   Temp: 98.8 °F (37.1 °C)   TempSrc: Oral   SpO2: 99%   Weight: 114 lb (51.7 kg)   Height: 5' 6\" (1.676 m)       Review of Systems   Constitutional: Negative. HENT:  Positive for ear pain. Eyes: Negative. Respiratory: Negative. Cardiovascular: Negative. Gastrointestinal: Negative. Neurological: Negative. Physical Exam  Vitals reviewed. Constitutional:       General: She is not in acute distress. Appearance: She is not ill-appearing. HENT:      Head: Normocephalic. Right Ear: Hearing and tympanic membrane normal. No decreased hearing noted. No mastoid tenderness. Tympanic membrane is not erythematous. Left Ear: Hearing, tympanic membrane, ear canal and external ear normal. No decreased hearing noted. No mastoid tenderness. Tympanic membrane is not erythematous.       Ears:      Comments: Right ear canal erythematous and mildly swollen, tragus is

## 2024-02-14 NOTE — Clinical Note
Mary Beth Ward was seen and treated in our emergency department on 2/15/2023. She may return to work on 02/17/2023. If you have any questions or concerns, please don't hesitate to call.       Yuriy Shay PA-C 74